# Patient Record
Sex: MALE | Race: WHITE | NOT HISPANIC OR LATINO
[De-identification: names, ages, dates, MRNs, and addresses within clinical notes are randomized per-mention and may not be internally consistent; named-entity substitution may affect disease eponyms.]

---

## 2019-11-14 ENCOUNTER — TRANSCRIPTION ENCOUNTER (OUTPATIENT)
Age: 34
End: 2019-11-14

## 2019-11-18 ENCOUNTER — TRANSCRIPTION ENCOUNTER (OUTPATIENT)
Age: 34
End: 2019-11-18

## 2020-02-11 ENCOUNTER — TRANSCRIPTION ENCOUNTER (OUTPATIENT)
Age: 35
End: 2020-02-11

## 2022-04-14 ENCOUNTER — TRANSCRIPTION ENCOUNTER (OUTPATIENT)
Age: 37
End: 2022-04-14

## 2022-06-15 ENCOUNTER — NON-APPOINTMENT (OUTPATIENT)
Age: 37
End: 2022-06-15

## 2022-08-02 ENCOUNTER — OFFICE (OUTPATIENT)
Dept: URBAN - METROPOLITAN AREA CLINIC 29 | Facility: CLINIC | Age: 37
Setting detail: OPHTHALMOLOGY
End: 2022-08-02
Payer: MEDICAID

## 2022-08-02 DIAGNOSIS — H25.13: ICD-10-CM

## 2022-08-02 DIAGNOSIS — H40.003: ICD-10-CM

## 2022-08-02 DIAGNOSIS — H53.40: ICD-10-CM

## 2022-08-02 PROCEDURE — 92083 EXTENDED VISUAL FIELD XM: CPT | Performed by: OPHTHALMOLOGY

## 2022-08-02 PROCEDURE — 92020 GONIOSCOPY: CPT | Performed by: OPHTHALMOLOGY

## 2022-08-02 PROCEDURE — 76514 ECHO EXAM OF EYE THICKNESS: CPT | Performed by: OPHTHALMOLOGY

## 2022-08-02 PROCEDURE — 92004 COMPRE OPH EXAM NEW PT 1/>: CPT | Performed by: OPHTHALMOLOGY

## 2022-08-02 PROCEDURE — 92133 CPTRZD OPH DX IMG PST SGM ON: CPT | Performed by: OPHTHALMOLOGY

## 2022-08-02 ASSESSMENT — PACHYMETRY
OD_CT_CORRECTION: -4
OD_CT_UM: 609
OS_CT_CORRECTION: -5
OS_CT_UM: 615

## 2022-08-02 ASSESSMENT — CONFRONTATIONAL VISUAL FIELD TEST (CVF)
OD_FINDINGS: SUPEROTEMPORAL DEFECT, INFEROTEMPORAL DEFECT
OS_FINDINGS: SUPEROTEMPORAL DEFECT, INFEROTEMPORAL DEFECT

## 2022-08-02 ASSESSMENT — VISUAL ACUITY: OD_BCVA: 20/30+1

## 2022-08-18 PROBLEM — Z00.00 ENCOUNTER FOR PREVENTIVE HEALTH EXAMINATION: Status: ACTIVE | Noted: 2022-08-18

## 2022-08-22 ENCOUNTER — APPOINTMENT (OUTPATIENT)
Dept: NEUROSURGERY | Facility: CLINIC | Age: 37
End: 2022-08-22

## 2022-08-22 PROCEDURE — 99205 OFFICE O/P NEW HI 60 MIN: CPT

## 2022-08-22 NOTE — PHYSICAL EXAM
[General Appearance - Alert] : alert [General Appearance - In No Acute Distress] : in no acute distress [Oriented To Time, Place, And Person] : oriented to person, place, and time [Impaired Insight] : insight and judgment were intact [Affect] : the affect was normal [Cranial Nerves Trigeminal (V)] : facial sensation intact symmetrically [Cranial Nerves Facial (VII)] : face symmetrical [Cranial Nerves Glossopharyngeal (IX)] : tongue and palate midline [Cranial Nerves Accessory (XI - Cranial And Spinal)] : head turning and shoulder shrug symmetric [Cranial Nerves Hypoglossal (XII)] : there was no tongue deviation with protrusion [Motor Tone] : muscle tone was normal in all four extremities [Motor Strength] : muscle strength was normal in all four extremities [Sensation Tactile Decrease] : light touch was intact [Abnormal Walk] : normal gait [Balance] : balance was intact [Cranial Nerves Vestibulocochlear (VIII)] : hearing was intact bilaterally [Mood] : the mood was normal [FreeTextEntry5] : Pupils equal, round, reactive to light bilaterally; asymmetric bitemporal hemianopsia with OD sup, inf and OS sup temporal fields affected more profoundly than OS temporal field

## 2022-08-22 NOTE — DATA REVIEWED
[de-identified] : MRI PITUITARY WITH AND WITHOUT CONTRAST: 8/17/22: IMPRESSION-  Large sellar/suprasellar/infrasellar mass as described above, likely \par representing pituitary macroadenoma. There is severe compression of the optic \par chiasm, mass effect on the inferior frontal lobes and possible left cavernous \par sinus invasion.

## 2022-08-22 NOTE — ASSESSMENT
[FreeTextEntry1] : Mr. Markham presents with an apprxoimately 6 month history of progressive loss of peripheral vision. Visual field examination demonstrates asymmetric bitemporal hemianopsia, with complete right temporal hemianopsia and left superior temporal quadranopsia with partial preservation of left inferior temporal quadrant. Prolactin is normal. MRI 8/17/22 reviewed independently by me demonstrates a large, approximately 4.5 cm sellar mass with suprasellar extension and severe anatomic compression of the optic chiasm. There is mild brain compression of the inferomedial frontal lobes without evidence for subjacent vasogenic edema. There is possible extension into the  cavernous sinuses. Findings are most consistent with a symptomatic, nonfunctional pituitary macroadenoma. \par \par Natural hisotry discussed with patient and his mother. Alternative management strategies reviewed. Surgical resection recommended. Risks including but not limited to bleeding, infection, vascular injury, stroke, coma, death, weakness/paralysis, visual/sensory loss including blindness, loss of speech/language/cognitive/memory function, changes in personality/behavior, failure to improve, failure to resect, recurrence, need for staged resection, panhypopituitarism with need for hormone replacement therapy, possibly for life, CSF leak, meningitis discussed. Patient and his mother understand the risks, benefits, and alternatives, and wish to proceed. We will therefore schedule Mr. Markham for endoscopic transnasal transsphenoidal resection of pituitary macroadenoma with frameless stereotactic navigation in the near future.\par \par Mr. Markham will require a frameless protocol CT head without contrast and a frameless protocol MRI brain with gadolinium for intraoperative stereotactic navigation prior to surgery. We will coordinate with Dr. Kenneth Ford's office to coordinate preoperative evaluation and surgical date. In addition, the patient has been actively engaged in a methadone program for 5 years. I have suggested that we arrange for preoperative consultation with Dr. Rush from pain management in order to coordinate with his program and prepare for postoperative pain management. He may also require preoperative consultation with anesthesiology.  \par \par I have asked the patient to contact me for any symptomatic development or progression in the interim at which time we can obtain expedited follow up imaging. My office will contact him within the next 24-48 hours regarding surgical date and preoperative planning.\par \par A total of 60 minutes were spent relative to this encounter.\par \par
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
## 2022-08-26 ENCOUNTER — APPOINTMENT (OUTPATIENT)
Dept: PAIN MANAGEMENT | Facility: CLINIC | Age: 37
End: 2022-08-26

## 2022-08-26 VITALS
WEIGHT: 250 LBS | DIASTOLIC BLOOD PRESSURE: 80 MMHG | BODY MASS INDEX: 35 KG/M2 | HEIGHT: 71 IN | OXYGEN SATURATION: 97 % | SYSTOLIC BLOOD PRESSURE: 122 MMHG | RESPIRATION RATE: 12 BRPM | HEART RATE: 72 BPM

## 2022-08-26 DIAGNOSIS — Z79.899 OTHER LONG TERM (CURRENT) DRUG THERAPY: ICD-10-CM

## 2022-08-26 DIAGNOSIS — E23.6 OTHER DISORDERS OF PITUITARY GLAND: ICD-10-CM

## 2022-08-26 PROCEDURE — 99204 OFFICE O/P NEW MOD 45 MIN: CPT

## 2022-08-26 NOTE — ASSESSMENT
[FreeTextEntry1] : Mr. WILD MONIQUE is a 37 year M  with progressive loss of peripheral vision, MRI demonstrating a large, approximately 4.5 cm sellar mass with suprasellar extension and severe anatomic compression of the optic chiasm consistent with a symptomatic, nonfunctional pituitary macroadenoma. Scheduled for endoscopic transnasal transsphenoidal resection of pituitary macroadenoma with frameless stereotactic navigation  \par \par >> Medications\par  \par Motivated to c/w wean down methadone regimen, however recommend visiting this after surgical intervention\par \par Postoperatively may consider adding a breakthrough dose, particularly of Methadone q 8hrs PRN for pain postoperatively \par \par Phonecall placed to [facility] regarding confirming methadone dose, that should be furnished in writing prior to surgery. Patient provided with form requesting this\par \par Preop EKG eval QTc interval - Dr Valladares for baseline QTc\par  \par >> Interventions\par  \par None indicated at this time, may be a candidate for lumbar facet injections for low back pain that seems to be facet related in origin\par  \par >> Therapy and Other Modalities\par  \par Continue with daily home stretching regimen\par \par >> Imaging and Other Studies\par \par See Media \par \par >> Consults\par \par None ordered

## 2022-08-26 NOTE — CONSULT LETTER
[Dear  ___] : Dear  [unfilled], [Consult Letter:] : I had the pleasure of evaluating your patient, [unfilled]. [Please see my note below.] : Please see my note below. [Consult Closing:] : Thank you very much for allowing me to participate in the care of this patient.  If you have any questions, please do not hesitate to contact me. [Sincerely,] : Sincerely, [FreeTextEntry3] : Kenneth Rush DO

## 2022-08-26 NOTE — PHYSICAL EXAM
[de-identified] : General: Well-developed and well-nourished.  No acute distress.\par Psychiatric: Behavior was cooperative cooperative\par Head:  Normocephalic and atraumatic\par Eyes:  Sclera white. Conjunctiva and eyelids pink and moist without discharge.\par Cardiovascular:  Regular\par Respiratory:  Trachea midline. Normal effort.\par No accessory muscle use with respiration\par Abdomen: Non distended, soft and nontender\par Skin:  No rashes, ulcers, or lesions appreciated.\par Back  Mild pain with extension,   normal ROM\par Extremities: No edema \par Musculoskeletal: Moving all extremities freely\par Neuro: CN 2-12 Grossly intact\par Sensation to light touch is intact extremities\par Gait: Ambulates with no assistive device.

## 2022-08-26 NOTE — DATA REVIEWED
[FreeTextEntry1] : MRI: MRI PITUITARY WITH AND WITHOUT CONTRAST: 8/17/22: IMPRESSION- Large sellar/suprasellar/infrasellar mass as described above, likely \par representing pituitary macroadenoma. There is severe compression of the optic \par chiasm, mass effect on the inferior frontal lobes and possible left cavernous \par sinus invasion. \par \par [place] Checked  | Reference #: [id number]\par ibrahima estevez, 1985Search Date: 08/26/2022 14:00:09 PM\par There are no results for the search terms that you entered.
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
## 2022-09-20 ENCOUNTER — RESULT REVIEW (OUTPATIENT)
Age: 37
End: 2022-09-20

## 2022-09-26 ENCOUNTER — RESULT REVIEW (OUTPATIENT)
Age: 37
End: 2022-09-26

## 2022-09-26 ENCOUNTER — APPOINTMENT (OUTPATIENT)
Dept: NEUROSURGERY | Facility: CLINIC | Age: 37
End: 2022-09-26

## 2022-09-29 ENCOUNTER — TRANSCRIPTION ENCOUNTER (OUTPATIENT)
Age: 37
End: 2022-09-29

## 2022-09-30 ENCOUNTER — RESULT REVIEW (OUTPATIENT)
Age: 37
End: 2022-09-30

## 2022-12-19 DIAGNOSIS — F13.939 SEDATIVE, HYPNOTIC OR ANXIOLYTIC USE, UNSPECIFIED WITH WITHDRAWAL, UNSPECIFIED: ICD-10-CM

## 2022-12-19 DIAGNOSIS — Z87.891 PERSONAL HISTORY OF NICOTINE DEPENDENCE: ICD-10-CM

## 2022-12-19 DIAGNOSIS — Z78.9 OTHER SPECIFIED HEALTH STATUS: ICD-10-CM

## 2022-12-19 DIAGNOSIS — Z86.018 PERSONAL HISTORY OF OTHER BENIGN NEOPLASM: ICD-10-CM

## 2022-12-19 DIAGNOSIS — Z86.59 PERSONAL HISTORY OF OTHER MENTAL AND BEHAVIORAL DISORDERS: ICD-10-CM

## 2022-12-19 DIAGNOSIS — F12.91 CANNABIS USE, UNSPECIFIED, IN REMISSION: ICD-10-CM

## 2022-12-20 ENCOUNTER — TRANSCRIPTION ENCOUNTER (OUTPATIENT)
Age: 37
End: 2022-12-20

## 2022-12-20 ENCOUNTER — APPOINTMENT (OUTPATIENT)
Dept: NEUROSURGERY | Facility: CLINIC | Age: 37
End: 2022-12-20

## 2022-12-20 VITALS
SYSTOLIC BLOOD PRESSURE: 149 MMHG | BODY MASS INDEX: 38.5 KG/M2 | HEIGHT: 71 IN | OXYGEN SATURATION: 96 % | DIASTOLIC BLOOD PRESSURE: 81 MMHG | WEIGHT: 275 LBS | HEART RATE: 74 BPM

## 2022-12-20 PROCEDURE — 99024 POSTOP FOLLOW-UP VISIT: CPT

## 2022-12-20 NOTE — ASSESSMENT
[FreeTextEntry1] : Mr. Markham presents status post  endoscopic transnasal transsphenoidal resection of pituitary adenoma. He reports complete resolution of bitemporal hemianopsia. Formal visual field examination demonstrates remarkable improvement. He continues to be managed from an endocrinologic perspective under the expert direction of Dr. Dorantes. I am extremely pleased with his postoperative recovery. I plan to obtain MRI pituitary with and without gadolinium to serve as a new baseline for longitudinal surveillance. I have asked him to contact my office once this has been performed so that I may review the study. I expect that we will recommend annual surveillance, though will defer this recommendation until we have reviewed the pending MRI. \par \par I also reinforced the importance of clinical follow up with Dr. Klye Dorantes from an endocrinologic perspective as well as surveillance visual field examinations under the direction of Dr. Alfred Kent.\par \par I have asked the patient to contact me for any symptomatic development or progression in the interim at which time we can obtain expedited follow up imaging.\par \par A total of 45 minutes were spent relative to this encounter.\par

## 2022-12-20 NOTE — HISTORY OF PRESENT ILLNESS
[FreeTextEntry1] : Mr. Markham presents status post endoscopic transnasal transsphenoidal resection of pituitary adenoma on 9/26/22 at [facility]. Postoperatively noted to have resolution of bitemporal hemianopsia and noted to have mild-moderate diabetes insipidus responsive to one dose of ddAVP. He didn't require ddAVP upon discharge after conversation and scheduled consultation with Dr. Dorantes. He was discharged on hydrocortisone 20mg qAM/PM following telephone consultation with Dr. Dorantes. In addition, he has followed up with Dr. Ford as instructed since discharge.\par \par Today the patient sates he is doing well. He had initially presented with hypogonadism preoperatively, and testosterone supplementation continues to be managed by Dr. Dorantes. He presented to Dr. Dorantes in November in outpatient follow up with clinical suspicion for recurrent diabetes insipidus and was started on DDAVP 0.1 mg po at nighttime to good effect. He remains on hydrocortisone 10 mg QAM and 5 mg QPM po. Postoperative evaluation by neuro-ophthalmologist Dr. Alfred Kent (10/25/22) included Blanca visual field examination which demonstrated near complete resolution of preoperative deficit with trace residual nasal defect in the right eye and full visual fields in the left eye. Denies other neurological symptoms at this time. \par \par \par

## 2022-12-20 NOTE — DATA REVIEWED
[de-identified] : CT HEAD WITHOUT CONTRAST: POSTOPERATIVE: 9/26/22: IMPRESSION-Postoperative changes within the sella/suprasellar region with interval\par significant debulking/resection of known pituitary mass and no longer soft\par tissue compression on the optic chiasm. Postop hemorrhage and air within the\par sella turcica/suprasellar region with difficulty determining degree of\par residual tumor.

## 2022-12-20 NOTE — PHYSICAL EXAM
[General Appearance - Alert] : alert [General Appearance - In No Acute Distress] : in no acute distress [General Appearance - Well Nourished] : well nourished [Oriented To Time, Place, And Person] : oriented to person, place, and time [Impaired Insight] : insight and judgment were intact [Affect] : the affect was normal [Cranial Nerves Trigeminal (V)] : facial sensation intact symmetrically [Cranial Nerves Facial (VII)] : face symmetrical [Cranial Nerves Vestibulocochlear (VIII)] : hearing was intact bilaterally [Cranial Nerves Glossopharyngeal (IX)] : tongue and palate midline [Cranial Nerves Accessory (XI - Cranial And Spinal)] : head turning and shoulder shrug symmetric [Cranial Nerves Hypoglossal (XII)] : there was no tongue deviation with protrusion [Motor Tone] : muscle tone was normal in all four extremities [Motor Strength] : muscle strength was normal in all four extremities [Sensation Tactile Decrease] : light touch was intact [Abnormal Walk] : normal gait [Balance] : balance was intact [FreeTextEntry5] : +++

## 2023-01-17 ENCOUNTER — RESULT REVIEW (OUTPATIENT)
Age: 38
End: 2023-01-17

## 2023-01-24 ENCOUNTER — APPOINTMENT (OUTPATIENT)
Dept: NEUROSURGERY | Facility: CLINIC | Age: 38
End: 2023-01-24
Payer: MEDICAID

## 2023-01-24 DIAGNOSIS — K21.9 GASTRO-ESOPHAGEAL REFLUX DISEASE W/OUT ESOPHAGITIS: ICD-10-CM

## 2023-01-24 DIAGNOSIS — E23.7 DISORDER OF PITUITARY GLAND, UNSPECIFIED: ICD-10-CM

## 2023-01-24 PROCEDURE — 99215 OFFICE O/P EST HI 40 MIN: CPT | Mod: 95

## 2023-01-24 NOTE — HISTORY OF PRESENT ILLNESS
[FreeTextEntry1] : Mr. Markham has agreed to two-way audiovisual telehealth consultation. He is located at her home [address] [place] [address] and I am located at my office at [facility].\par \par \par Mr Markham presents in neurosurgical follow up. Patient underwent endoscopic transnasal transsphenoidal resection of pituitary adenoma on 9/26/22 at [facility]. Pathology confirmed slightly elevated Ki-67 labeling index ( 4-7%). Patient states he continues to feel better each day. Endocrinologic management and surveillance as per Dr. Dorantes. Continues to be maintained on DDAVP 0.1mg po at night time and hydrocortisone 10 mg QAM and 5 mg QPM. Repeat endocrinologic labs pending. Reports resolution of bitemporal hemianopsia. Surveillance MRI pituitary detailed below.  Denies other neurological symptoms at this time. \par \par

## 2023-01-24 NOTE — ASSESSMENT
[FreeTextEntry1] : Mr. Markham underwent successful endoscopic transnasal transsphenoidal resection of pituitary adenoma. Pathology demonstrates evidence for modestly elevated Ki-67 index between 4-7%. He continues to be managed from an endocrinologic perspective under the expert direction of Dr. Dorantes. Visual craft have normalized. MRI pituitary with and without gadolinium demonstrates no overt evidence for residual or  recurrent pituitary tumor, though this is not definitive for residual. Considering, Ki-67 index is slightly elevated, I recommend close interval surveillance. Therefore, I plan to obtain a MRI pituitary with and without gadolinium in 6 months with an appointment to follow.\par \par We have provided a prescription for a PPI while he is on hydrocortisone for gastric protection. \par \par I also reinforced the importance of clinical follow up with Dr. Kyle Dorantes from an endocrinologic perspective as well as surveillance visual field examinations under the direction of Dr. Alfred Kent as per his direction. \par \par I have asked the patient to contact me for any symptomatic development or progression in the interim at which time we can obtain expedited follow up imaging.\par \par A total of 45 minutes were spent relative to this encounter.\par  \par

## 2023-04-19 ENCOUNTER — RX RENEWAL (OUTPATIENT)
Age: 38
End: 2023-04-19

## 2023-04-19 RX ORDER — PANTOPRAZOLE 20 MG/1
20 TABLET, DELAYED RELEASE ORAL DAILY
Qty: 30 | Refills: 3 | Status: ACTIVE | COMMUNITY
Start: 2023-01-24 | End: 1900-01-01